# Patient Record
Sex: MALE | Race: WHITE | NOT HISPANIC OR LATINO | Employment: UNEMPLOYED | URBAN - METROPOLITAN AREA
[De-identification: names, ages, dates, MRNs, and addresses within clinical notes are randomized per-mention and may not be internally consistent; named-entity substitution may affect disease eponyms.]

---
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
## 2025-01-30 ENCOUNTER — OFFICE VISIT (OUTPATIENT)
Dept: URGENT CARE | Age: 20
End: 2025-01-30
Payer: COMMERCIAL

## 2025-01-30 VITALS
OXYGEN SATURATION: 99 % | HEART RATE: 89 BPM | WEIGHT: 240 LBS | RESPIRATION RATE: 20 BRPM | TEMPERATURE: 98.5 F | SYSTOLIC BLOOD PRESSURE: 112 MMHG | DIASTOLIC BLOOD PRESSURE: 90 MMHG

## 2025-01-30 DIAGNOSIS — J06.9 VIRAL UPPER RESPIRATORY INFECTION: Primary | ICD-10-CM

## 2025-01-30 DIAGNOSIS — R43.2 LOSS OF TASTE: ICD-10-CM

## 2025-01-30 LAB
SARS-COV-2 AG UPPER RESP QL IA: NEGATIVE
VALID CONTROL: NORMAL

## 2025-01-30 PROCEDURE — 87811 SARS-COV-2 COVID19 W/OPTIC: CPT | Performed by: NURSE PRACTITIONER

## 2025-01-30 PROCEDURE — G0382 LEV 3 HOSP TYPE B ED VISIT: HCPCS | Performed by: NURSE PRACTITIONER

## 2025-01-30 PROCEDURE — S9083 URGENT CARE CENTER GLOBAL: HCPCS | Performed by: NURSE PRACTITIONER

## 2025-01-30 NOTE — PROGRESS NOTES
[facility]        NAME: Percy Kumar is a 19 y.o. male  : 2005    MRN: [id number]  DATE: 2025  TIME: 6:43 PM    Assessment and Plan   Viral upper respiratory infection [J06.9]  1. Viral upper respiratory infection        2. Loss of taste  Poct Covid 19 Rapid Antigen Test            Patient Instructions     Rapid covid test done; is negative  Likely viral infection   Maintain hydration and nutrition   Follow up with PCP in 3-5 days.  Proceed to  ER if symptoms worsen.    If tests have been performed at [facility], our office will contact you with results if changes need to be made to the care plan discussed with you at the visit.  You can review your full results on [facility].    Chief Complaint     Chief Complaint   Patient presents with    Fever    Cough     Fever, stuffy nose, lack of teste and smell since yesterday.         History of Present Illness       HPI  Reports loss of sense of taste and smell, runny nose, that started yesterday. Also had a fever that broke at about 3 am, 15 hrs ago.     Review of Systems   Review of Systems   Constitutional:  Positive for appetite change (loss odf sense of taste and smell) and fever (did not chek temp at home). Negative for fatigue.   HENT:  Positive for congestion and rhinorrhea. Negative for ear pain and sore throat.    Respiratory:  Negative for cough and wheezing.    Cardiovascular:  Negative for chest pain.   Gastrointestinal:  Negative for diarrhea and vomiting.   Neurological:  Negative for headaches.         Current Medications       Current Outpatient Medications:     acetaminophen (TYLENOL) 500 mg tablet, Take 2 tablets (1,000 mg total) by mouth every 6 (six) hours as needed for mild pain, Disp: 60 tablet, Rfl: 0    albuterol (ProAir HFA) 90 mcg/act inhaler, Inhale 2 puffs every 6 (six) hours as needed for wheezing, Disp: 8.5 g, Rfl: 0    benzonatate (TESSALON) 200 MG capsule, Take 1 capsule (200 mg total) by mouth 3  (three) times a day as needed for cough (Patient not taking: Reported on 1/30/2025), Disp: 20 capsule, Rfl: 0    ibuprofen (MOTRIN) 600 mg tablet, Take 1 tablet (600 mg total) by mouth every 6 (six) hours as needed for mild pain (Patient not taking: Reported on 1/30/2025), Disp: 30 tablet, Rfl: 0    ondansetron (ZOFRAN) 4 mg tablet, Take 1 tablet by mouth every 6 (six) hours (Patient not taking: Reported on 12/29/2023), Disp: 12 tablet, Rfl: 0    Current Allergies     Allergies as of 01/30/2025    (No Known Allergies)            The following portions of the patient's history were reviewed and updated as appropriate: allergies, current medications, past family history, past medical history, past social history, past surgical history and problem list.     Past Medical History:   Diagnosis Date    Asthma        History reviewed. No pertinent surgical history.    History reviewed. No pertinent family history.      Medications have been verified.        Objective   /90   Pulse 89   Temp 98.5 °F (36.9 °C)   Resp 20   Wt 109 kg (240 lb)   SpO2 99%   No LMP for male patient.       Physical Exam     Physical Exam  Constitutional:       Appearance: He is not ill-appearing.   HENT:      Right Ear: Tympanic membrane normal.      Left Ear: Tympanic membrane normal.      Nose: Rhinorrhea present.      Mouth/Throat:      Pharynx: No posterior oropharyngeal erythema.   Cardiovascular:      Rate and Rhythm: Regular rhythm.      Heart sounds: Normal heart sounds.   Pulmonary:      Effort: Pulmonary effort is normal.      Breath sounds: Normal breath sounds.   Lymphadenopathy:      Cervical: No cervical adenopathy.

## 2025-01-30 NOTE — LETTER
January 30, 2025     Patient: Pecry Kumar   YOB: 2005   Date of Visit: 1/30/2025       To Whom it May Concern:    Percy Kumar was seen in my clinic on 1/30/2025. He may return to work on 01/31/2025 .    If you have any questions or concerns, please don't hesitate to call.         Sincerely,          LINDEN Castañeda        CC: No Recipients

## 2025-03-01 ENCOUNTER — HOSPITAL ENCOUNTER (EMERGENCY)
Facility: HOSPITAL | Age: 20
Discharge: HOME | End: 2025-03-01
Attending: EMERGENCY MEDICINE
Payer: OTHER MISCELLANEOUS

## 2025-03-01 VITALS
TEMPERATURE: 97.9 F | OXYGEN SATURATION: 98 % | HEART RATE: 107 BPM | SYSTOLIC BLOOD PRESSURE: 142 MMHG | RESPIRATION RATE: 18 BRPM | DIASTOLIC BLOOD PRESSURE: 91 MMHG

## 2025-03-01 DIAGNOSIS — T23.162A SUPERFICIAL BURN OF BACK OF LEFT HAND, INITIAL ENCOUNTER: Primary | ICD-10-CM

## 2025-03-01 PROCEDURE — 99283 EMERGENCY DEPT VISIT LOW MDM: CPT | Performed by: EMERGENCY MEDICINE

## 2025-03-01 PROCEDURE — 99282 EMERGENCY DEPT VISIT SF MDM: CPT

## 2025-03-01 RX ORDER — ACETAMINOPHEN 325 MG/1
975 TABLET ORAL ONCE
Status: DISCONTINUED | OUTPATIENT
Start: 2025-03-01 | End: 2025-03-01 | Stop reason: HOSPADM

## 2025-03-01 NOTE — ED PROVIDER NOTES
Time reflects when diagnosis was documented in both MDM as applicable and the Disposition within this note       Time User Action Codes Description Comment    3/1/2025  1:55 AM Lewis Lepe Add [T23.162A] Superficial burn of back of left hand, initial encounter           ED Disposition       ED Disposition   Discharge    Condition   Stable    Date/Time   Sat Mar 1, 2025  1:54 AM    Comment   Percy Kumar discharge to home/self care.                   Assessment & Plan       Medical Decision Making  20-year-old male presenting from work due to oil spill on left hand.  Patient states he was working at [employer] when he pulled the wheel catch out which was overfilled and splashed on his left hand.  Patient states that he washed it in cold water immediately.  Patient has full range of motion of hand, normal sensation distally with normal cap refill.  Will did not splash anywhere else other than a crossed all 4 knuckles of his left hand.    On exam redness, no swelling of all 4 left MCP joints.  No blisters and tender to palpation suggestive of a superficial burn.  Plan for pain management with Tylenol and ibuprofen, patient may use cold water or ice for symptomatic relief as well.  Plan for discharge.    Risk  OTC drugs.             Medications   acetaminophen (TYLENOL) tablet 975 mg (has no administration in time range)       ED Risk Strat Scores                                                History of Present Illness       Chief Complaint   Patient presents with    Burn     Pt reports spilling hot cooking oil on left hand about 1 hour PTA. Endorses pain and redness to left hand. States he ran cold water on left hand immediately after. -blistering       Past Medical History:   Diagnosis Date    Asthma       History reviewed. No pertinent surgical history.   History reviewed. No pertinent family history.   Social History     Tobacco Use    Smoking status: Never     Passive exposure: Never    Smokeless tobacco:  Never   Vaping Use    Vaping status: Never Used   Substance Use Topics    Alcohol use: Never    Drug use: Never      E-Cigarette/Vaping    E-Cigarette Use Never User       E-Cigarette/Vaping Substances    Nicotine No     THC No     CBD No     Flavoring No     Other No     Unknown No       I have reviewed and agree with the history as documented.     20-year-old male presenting from work due to oil spill on left hand.  Patient states he was working at [employer] when he pulled the wheel catch out which was overfilled and splashed on his left hand.  Patient states that he washed it in cold water immediately.  Patient has full range of motion of hand, normal sensation distally with normal cap refill.  Will did not splash anywhere else other than a crossed all 4 knuckles of his left hand.        Review of Systems   Constitutional:  Negative for chills and fever.   Respiratory:  Negative for cough and shortness of breath.    Cardiovascular:  Negative for chest pain.   Gastrointestinal:  Negative for abdominal pain and vomiting.   Skin:  Positive for color change and wound. Negative for rash.   All other systems reviewed and are negative.          Objective       ED Triage Vitals   Temperature Pulse Blood Pressure Respirations SpO2 Patient Position - Orthostatic VS   03/01/25 0135 03/01/25 0134 03/01/25 0134 03/01/25 0134 03/01/25 0134 03/01/25 0134   97.9 °F (36.6 °C) (!) 107 142/91 18 98 % Sitting      Temp Source Heart Rate Source BP Location FiO2 (%) Pain Score    03/01/25 0135 03/01/25 0134 03/01/25 0134 -- 03/01/25 0134    Oral Monitor Right arm  9      Vitals      Date and Time Temp Pulse SpO2 Resp BP Pain Score FACES Pain Rating User   03/01/25 0135 97.9 °F (36.6 °C) -- -- -- -- -- -- RL   03/01/25 0134 -- 107 98 % 18 142/91 9 -- RL            Physical Exam  Vitals and nursing note reviewed.   Constitutional:       General: He is not in acute distress.     Appearance: He is well-developed.   HENT:      Head:  Normocephalic and atraumatic.      Nose: Nose normal. No congestion.      Mouth/Throat:      Pharynx: Oropharynx is clear.   Eyes:      Extraocular Movements: Extraocular movements intact.      Conjunctiva/sclera: Conjunctivae normal.   Cardiovascular:      Rate and Rhythm: Normal rate and regular rhythm.      Pulses: Normal pulses.      Heart sounds: Normal heart sounds. No murmur heard.  Pulmonary:      Effort: Pulmonary effort is normal. No respiratory distress.      Breath sounds: Normal breath sounds.   Abdominal:      General: Abdomen is flat.   Musculoskeletal:         General: No deformity or signs of injury. Normal range of motion.      Cervical back: Normal range of motion and neck supple. No rigidity or tenderness.   Skin:     General: Skin is warm and dry.      Capillary Refill: Capillary refill takes less than 2 seconds.      Findings: Lesion present. No bruising or rash.      Comments: Superficial burn on all 4 left MCP joints.  No blistering, full range of motion of fingers, normal cap refill and distal sensation.   Neurological:      Mental Status: He is alert.      Sensory: No sensory deficit.         Results Reviewed       None            No orders to display       Procedures    ED Medication and Procedure Management   Prior to Admission Medications   Prescriptions Last Dose Informant Patient Reported? Taking?   acetaminophen (TYLENOL) 500 mg tablet   No No   Sig: Take 2 tablets (1,000 mg total) by mouth every 6 (six) hours as needed for mild pain   albuterol (ProAir HFA) 90 mcg/act inhaler   No No   Sig: Inhale 2 puffs every 6 (six) hours as needed for wheezing   benzonatate (TESSALON) 200 MG capsule   No No   Sig: Take 1 capsule (200 mg total) by mouth 3 (three) times a day as needed for cough   Patient not taking: Reported on 1/30/2025   ibuprofen (MOTRIN) 600 mg tablet   No No   Sig: Take 1 tablet (600 mg total) by mouth every 6 (six) hours as needed for mild pain   Patient not taking: Reported  on 1/30/2025   ondansetron (ZOFRAN) 4 mg tablet   No No   Sig: Take 1 tablet by mouth every 6 (six) hours   Patient not taking: Reported on 12/29/2023      Facility-Administered Medications: None     Patient's Medications   Discharge Prescriptions    No medications on file     No discharge procedures on file.  ED SEPSIS DOCUMENTATION   Time reflects when diagnosis was documented in both MDM as applicable and the Disposition within this note       Time User Action Codes Description Comment    3/1/2025  1:55 AM Lewis Lepe Add [T23.162A] Superficial burn of back of left hand, initial encounter                  Lewis Lepe MD  03/01/25 0158

## 2025-03-01 NOTE — ED NOTES
Pt seen, assessed, treated and d/c'd by provider, independent of nursing care.       Gianna Grace RN  03/01/25 [id number]

## 2025-03-01 NOTE — Clinical Note
Percy Kumar was seen and treated in our emergency department on 3/1/2025.                Diagnosis:     Percy  may return to work on return date.    He may return on this date: 03/04/2025         If you have any questions or concerns, please don't hesitate to call.      Lewis Lepe MD    ______________________________           _______________          _______________  Hospital Representative                              Date                                Time Vaccine status unknown

## 2025-03-01 NOTE — DISCHARGE INSTRUCTIONS
You may use Tylenol and ibuprofen for pain management.  Ice and cold water may also provide relief of burning pain.

## 2025-03-03 NOTE — ED ATTENDING ATTESTATION
3/1/2025  I, Daron Devine MD, saw and evaluated the patient. I have discussed the patient with the resident/non-physician practitioner and agree with the resident's/non-physician practitioner's findings, Plan of Care, and MDM as documented in the resident's/non-physician practitioner's note, except where noted. All available labs and Radiology studies were reviewed.  I was present for key portions of any procedure(s) performed by the resident/non-physician practitioner and I was immediately available to provide assistance.       At this point I agree with the current assessment done in the Emergency Department.  I have conducted an independent evaluation of this patient a history and physical is as follows:    ED Course         Critical Care Time  Procedures